# Patient Record
Sex: FEMALE | Race: BLACK OR AFRICAN AMERICAN
[De-identification: names, ages, dates, MRNs, and addresses within clinical notes are randomized per-mention and may not be internally consistent; named-entity substitution may affect disease eponyms.]

---

## 2018-02-17 ENCOUNTER — HOSPITAL ENCOUNTER (EMERGENCY)
Dept: HOSPITAL 62 - ER | Age: 23
Discharge: HOME | End: 2018-02-17
Payer: COMMERCIAL

## 2018-02-17 VITALS — SYSTOLIC BLOOD PRESSURE: 116 MMHG | DIASTOLIC BLOOD PRESSURE: 73 MMHG

## 2018-02-17 DIAGNOSIS — R51: Primary | ICD-10-CM

## 2018-02-17 DIAGNOSIS — Z88.0: ICD-10-CM

## 2018-02-17 LAB
A TYPE INFLUENZA AG: NEGATIVE
ADD MANUAL DIFF: NO
ALBUMIN SERPL-MCNC: 4.4 G/DL (ref 3.5–5)
ALP SERPL-CCNC: 55 U/L (ref 38–126)
ALT SERPL-CCNC: 33 U/L (ref 9–52)
ANION GAP SERPL CALC-SCNC: 12 MMOL/L (ref 5–19)
APPEARANCE UR: (no result)
APTT PPP: (no result) S
AST SERPL-CCNC: 26 U/L (ref 14–36)
B INFLUENZA AG: NEGATIVE
BASOPHILS # BLD AUTO: 0 10^3/UL (ref 0–0.2)
BASOPHILS NFR BLD AUTO: 0.8 % (ref 0–2)
BILIRUB DIRECT SERPL-MCNC: 0.3 MG/DL (ref 0–0.4)
BILIRUB SERPL-MCNC: 0.3 MG/DL (ref 0.2–1.3)
BILIRUB UR QL STRIP: NEGATIVE
BUN SERPL-MCNC: 12 MG/DL (ref 7–20)
CALCIUM: 9.4 MG/DL (ref 8.4–10.2)
CHLORIDE SERPL-SCNC: 102 MMOL/L (ref 98–107)
CO2 SERPL-SCNC: 25 MMOL/L (ref 22–30)
EOSINOPHIL # BLD AUTO: 0.1 10^3/UL (ref 0–0.6)
EOSINOPHIL NFR BLD AUTO: 2.4 % (ref 0–6)
ERYTHROCYTE [DISTWIDTH] IN BLOOD BY AUTOMATED COUNT: 14.9 % (ref 11.5–14)
GLUCOSE SERPL-MCNC: 93 MG/DL (ref 75–110)
GLUCOSE UR STRIP-MCNC: NEGATIVE MG/DL
HCT VFR BLD CALC: 40.1 % (ref 36–47)
HGB BLD-MCNC: 13 G/DL (ref 12–15.5)
KETONES UR STRIP-MCNC: NEGATIVE MG/DL
LYMPHOCYTES # BLD AUTO: 1.2 10^3/UL (ref 0.5–4.7)
LYMPHOCYTES NFR BLD AUTO: 35.9 % (ref 13–45)
MCH RBC QN AUTO: 23.2 PG (ref 27–33.4)
MCHC RBC AUTO-ENTMCNC: 32.3 G/DL (ref 32–36)
MCV RBC AUTO: 72 FL (ref 80–97)
MONOCYTES # BLD AUTO: 0.6 10^3/UL (ref 0.1–1.4)
MONOCYTES NFR BLD AUTO: 18.9 % (ref 3–13)
NEUTROPHILS # BLD AUTO: 1.4 10^3/UL (ref 1.7–8.2)
NEUTS SEG NFR BLD AUTO: 42 % (ref 42–78)
NITRITE UR QL STRIP: NEGATIVE
PH UR STRIP: 6 [PH] (ref 5–9)
PLATELET # BLD: 230 10^3/UL (ref 150–450)
POTASSIUM SERPL-SCNC: 4.3 MMOL/L (ref 3.6–5)
PROT SERPL-MCNC: 7.2 G/DL (ref 6.3–8.2)
PROT UR STRIP-MCNC: NEGATIVE MG/DL
RBC # BLD AUTO: 5.59 10^6/UL (ref 3.72–5.28)
SODIUM SERPL-SCNC: 139.1 MMOL/L (ref 137–145)
SP GR UR STRIP: 1.01
TOTAL CELLS COUNTED % (AUTO): 100 %
UROBILINOGEN UR-MCNC: NEGATIVE MG/DL (ref ?–2)
WBC # BLD AUTO: 3.3 10^3/UL (ref 4–10.5)

## 2018-02-17 PROCEDURE — 99284 EMERGENCY DEPT VISIT MOD MDM: CPT

## 2018-02-17 PROCEDURE — 81001 URINALYSIS AUTO W/SCOPE: CPT

## 2018-02-17 PROCEDURE — 70450 CT HEAD/BRAIN W/O DYE: CPT

## 2018-02-17 PROCEDURE — 85025 COMPLETE CBC W/AUTO DIFF WBC: CPT

## 2018-02-17 PROCEDURE — 80053 COMPREHEN METABOLIC PANEL: CPT

## 2018-02-17 PROCEDURE — 87804 INFLUENZA ASSAY W/OPTIC: CPT

## 2018-02-17 PROCEDURE — 36415 COLL VENOUS BLD VENIPUNCTURE: CPT

## 2018-02-17 PROCEDURE — S0183 PROCHLORPERAZINE 5 MG: HCPCS

## 2018-02-17 NOTE — RADIOLOGY REPORT (SQ)
EXAM DESCRIPTION:  CT HEAD WITHOUT



COMPLETED DATE/TIME:  2/17/2018 10:15 am



REASON FOR STUDY:  HEADACHE FOR 1 MONTH, INCREASING



COMPARISON:  None.



TECHNIQUE:  Axial images acquired through the brain without intravenous contrast.  Images reviewed wi
th bone, brain and subdural windows.  Images stored on PACS.

All CT scanners at this facility use dose modulation, iterative reconstruction, and/or weight based d
osing when appropriate to reduce radiation dose to as low as reasonably achievable (ALARA).

[facility]: Dose Right  [facility]: CareDose    [facility]: Dose Right    [facility]: Teradose 4D    [facility]: [employer]



RADIATION DOSE:  CT Rad equipment meets quality standard of care and radiation dose reduction techniq
ues were employed. CTDIvol: 64.6 mGy. DLP: 1163 mGy-cm.1162.97mGy.



LIMITATIONS:  None.



FINDINGS:  VENTRICLES: Normal size and contour.

CEREBRUM: Intracranially, no abnormal areas of increased decreased attenuation noted.  There is no ma
ss effect or shift.  No intracranial hemorrhage.

CEREBELLUM: No masses.  No hemorrhage.  No alteration of density.  No evidence for acute infarction.

EXTRAAXIAL SPACES: No fluid collections.  No masses.

ORBITS AND GLOBE: No intra- or extraconal masses.  Normal contour of globe without masses.

CALVARIUM: No fracture.

PARANASAL SINUSES: Mucosal thickening within the right maxillary sinus, both ethmoid sinuses ,and sph
enoid sinus.

SOFT TISSUES: No mass or hematoma.

OTHER: No other significant finding.



IMPRESSION:  MINIMAL CHRONIC SINUSITIS.  NORMAL BRAIN CT WITHOUT CONTRAST.

EVIDENCE OF ACUTE STROKE: NO.



COMMENT:  Quality ID # [id number]: Final reports with documentation of one or more dose reduction techniques
 (e.g., Automated exposure control, adjustment of the mA and/or kV according to patient size, use of 
iterative reconstruction technique)



TECHNICAL DOCUMENTATION:  JOB ID:  [id number]

[id number]

 2011 [employer]- All Rights Reserved

## 2018-02-17 NOTE — ER DOCUMENT REPORT
ED Medical Screen (RME)





- General


Chief Complaint: Headache


Stated Complaint: FLU LIKE SYMPTOMS


Time Seen by Provider: 02/17/18 09:52


Mode of Arrival: Ambulatory


Information source: Relative


Cannot obtain history due to: Mentally challenged


TRAVEL OUTSIDE OF THE [place] IN LAST 30 DAYS: No





- HPI


Onset: Other - FLU SXS x 3 DAYS, H.A. FOR 1 MONTH OR MORE, GRADUALLY INCREASING


Quality of pain: Achy, Dull


Associated Symptoms: Body/muscle aches, Cough (nonproductive), Fever, Headache


Recently seen / treated by doctor: No





- Related Data


Allergies/Adverse Reactions: 


 





iodine Allergy (Verified 02/17/18 09:14)


 


Penicillins Allergy (Verified 02/17/18 09:14)


 











Physical Exam





- Vital signs


Vitals: 





 











Temp Pulse Resp BP Pulse Ox


 


 98.5 F   78   20   139/68 H  99 


 


 02/17/18 09:17  02/17/18 09:17  02/17/18 09:17  02/17/18 09:17  02/17/18 09:17











Interpretation: Hypertensive.  No: Tachypneic, Febrile





- General


General appearance: Other - APPEARS UNCOMFORTABLE, RESTLESS.





- HEENT


Head: Normocephalic


Eyes: Normal


Conjunctiva: Normal


Ears: Normal


Nasal: Normal


Mouth/Lips: Normal


Mucous membranes: Normal





- Respiratory


Respiratory status: No respiratory distress


Breath sounds: Normal





- Cardiovascular


Rhythm: Regular





Course





- Vital Signs


Vital signs: 





 











Temp Pulse Resp BP Pulse Ox


 


 98.5 F   78   20   139/68 H  99 


 


 02/17/18 09:17  02/17/18 09:17  02/17/18 09:17  02/17/18 09:17  02/17/18 09:17

## 2018-02-17 NOTE — ER DOCUMENT REPORT
ED General





- General


Chief Complaint: Headache


Stated Complaint: FLU LIKE SYMPTOMS


Time Seen by Provider: 02/17/18 09:52


Mode of Arrival: Ambulatory


TRAVEL OUTSIDE OF THE [place] IN LAST 30 DAYS: No





- HPI


Patient complains to provider of: Flulike symptoms headache


Notes: 





Patient coming in with a history of Asperger's ADHD recently moved to [place] 

been present in [place] for approximately 2 weeks patient has flulike 

symptoms.  Patient came from [facility].  Does not have a 

established PCP.  States compliant with medications patient has been on inhaler 

according to the caregiver at bedside.  Use inhaler approximately 3-4 times a 

day.  States intermittent fevers controlled with Tylenol and Motrin.  Ms. 

complaint is that the patient has had a headache.  Headache gradually 

increasing over the last month to month and a half.  No trauma.  Upon my 

evaluation patient is resting comfortably and baseline according to caregiver.  

Patient was to be no I stressed playing on cell phone and coloring pictures.





- Related Data


Allergies/Adverse Reactions: 


 





iodine Allergy (Verified 02/17/18 09:14)


 


Penicillins Allergy (Verified 02/17/18 09:14)


 











Past Medical History





- General


Information source: Relative





- Social History


Smoking Status: Never Smoker


Frequency of alcohol use: Rare


Drug Abuse: None


Family History: Reviewed & Not Pertinent


Patient has suicidal ideation: No


Patient has homicidal ideation: No


Renal/ Medical History: Denies: Hx Peritoneal Dialysis


Psychiatric Medical History: Reports: Hx Attention Deficit Hyperactivity 

Disorder, Hx Bipolar Disorder





Review of Systems





- Review of Systems


Constitutional: No symptoms reported


EENT: Other - Flulike symptoms


Cardiovascular: No symptoms reported


Respiratory: No symptoms reported


Gastrointestinal: No symptoms reported


Genitourinary: No symptoms reported


Female Genitourinary: No symptoms reported


Musculoskeletal: No symptoms reported


Skin: No symptoms reported


Hematologic/Lymphatic: No symptoms reported


Neurological/Psychological: No symptoms reported


-: Yes All other systems reviewed and negative





Physical Exam





- Vital signs


Vitals: 


 











Temp Pulse Resp BP Pulse Ox


 


 98.5 F   78   20   139/68 H  99 


 


 02/17/18 09:17  02/17/18 09:17  02/17/18 09:17  02/17/18 09:17  02/17/18 09:17











Interpretation: Normal





- General


General appearance: Appears well, Alert





- HEENT


Head: Normocephalic, Atraumatic


Eyes: Normal


Pupils: PERRL





- Respiratory


Respiratory status: No respiratory distress


Chest status: Nontender


Breath sounds: Normal


Chest palpation: Normal





- Cardiovascular


Rhythm: Regular


Heart sounds: Normal auscultation


Murmur: No





- Abdominal


Inspection: Normal


Distension: No distension


Bowel sounds: Normal


Tenderness: Nontender


Organomegaly: No organomegaly





- Back


Back: Normal, Nontender





- Extremities


General upper extremity: Normal inspection, Nontender, Normal color, Normal ROM

, Normal temperature


General lower extremity: Normal inspection, Nontender, Normal color, Normal ROM

, Normal temperature, Normal weight bearing.  No: Ravin's sign





- Neurological


Neuro grossly intact: Yes


Cognition: Normal


Orientation: AAOx4


[place]aty Coma Scale Eye Opening: Spontaneous


[place] Coma Scale Verbal: Oriented


[place]aty Coma Scale Motor: Obeys Commands


[place] Coma Scale Total: 15


Speech: Normal


Motor strength normal: LUE, RUE, LLE, RLE


Sensory: Normal





- Psychological


Associated symptoms: Normal affect, Normal mood





- Skin


Skin Temperature: Warm


Skin Moisture: Dry


Skin Color: Normal





Course





- Re-evaluation


Re-evalutation: 





02/17/18 15:26


The patient presents with headache without signs of CNS bleed, stroke, infection

, or other serious etiology. The patient is neurologically intact. Given the 

extremely low risk of these diagnoses further testing and evaluation for these 

possibilities does not appear to be indicated at this time. The patient has 

been instructed to return if the symptoms worsen or change in any way.. Patient 

was given seen for slight nausea.  No vomiting seen here in ER.  Patient 

otherwise does not have any serious pathology seen laboratory studies and/or 

his examination will be discharged on follow-up with local PCP.





- Vital Signs


Vital signs: 


 











Temp Pulse Resp BP Pulse Ox


 


 98.5 F   78   20   139/68 H  99 


 


 02/17/18 09:17  02/17/18 09:17  02/17/18 09:17  02/17/18 09:17  02/17/18 09:17














- Laboratory


Result Diagrams: 


 02/17/18 10:10





 02/17/18 10:10


Laboratory results interpreted by me: 


 











  02/17/18





  10:10


 


WBC  3.3 L


 


RBC  5.59 H


 


MCV  72 L


 


MCH  23.2 L


 


RDW  14.9 H


 


Monocytes %  18.9 H


 


Absolute Neutrophils  1.4 L














Discharge





- Discharge


Clinical Impression: 


 Flu-like symptoms





Headache


Qualifiers:


 Headache type: unspecified Headache chronicity pattern: unspecified pattern 

Intractability: not intractable Qualified Code(s): R51 - Headache





Condition: Good


Instructions:  Family Physicians / Practices, Headache ([facility]), Nausea or Vomiting

, Nonspecific ([facility]), Neurologist, Viral Syndrome ([facility])


Additional Instructions: 


Your CAT scan today does not show any acute pathology.  Your laboratory studies 

also did not show any acute pathology.  Her ocular symptoms as far as nausea 

vomiting shortness of breath related to a viral illness.  Please continue to 

use your inhaler at home 2 puffs every 2-4 hours.  I would also recommend using 

the Compazine as prescribed for nausea and he can use it outside for headaches.

  Continue to take Tylenol and Motrin.  Highly recommend following up with a 

family physician and also following up with a neurologist to perform further 

evaluation of your headaches.


Prescriptions: 


Prochlorperazine Maleate [Compazine] 5 mg PO Q6 #30 tablet